# Patient Record
Sex: FEMALE | Race: WHITE | NOT HISPANIC OR LATINO | Employment: OTHER
[De-identification: names, ages, dates, MRNs, and addresses within clinical notes are randomized per-mention and may not be internally consistent; named-entity substitution may affect disease eponyms.]

---

## 2017-05-12 ENCOUNTER — CHARTING TRANS (OUTPATIENT)
Dept: OTHER | Age: 62
End: 2017-05-12

## 2017-05-12 ENCOUNTER — LAB SERVICES (OUTPATIENT)
Dept: OTHER | Age: 62
End: 2017-05-12

## 2017-05-15 LAB
ALBUMIN SERPL-MCNC: 4.3 G/DL (ref 3.6–5.1)
ALBUMIN/GLOB SERPL: 1.3 (ref 1–2.4)
ALP SERPL-CCNC: 132 UNITS/L (ref 45–117)
ALT SERPL-CCNC: 35 UNITS/L
ANION GAP SERPL CALC-SCNC: 16 MMOL/L (ref 10–20)
AST SERPL-CCNC: 19 UNITS/L
BASOPHILS # BLD: 0 K/MCL (ref 0–0.3)
BASOPHILS NFR BLD: 1 %
BILIRUB SERPL-MCNC: 0.5 MG/DL (ref 0.2–1)
BUN SERPL-MCNC: 14 MG/DL (ref 6–20)
BUN/CREAT SERPL: 24 (ref 7–25)
CALCIUM SERPL-MCNC: 9.7 MG/DL (ref 8.4–10.2)
CHLORIDE SERPL-SCNC: 107 MMOL/L (ref 98–107)
CO2 SERPL-SCNC: 26 MMOL/L (ref 21–32)
CREAT SERPL-MCNC: 0.6 MG/DL (ref 0.51–0.95)
DIFFERENTIAL METHOD BLD: ABNORMAL
EOSINOPHIL # BLD: 0.1 K/MCL (ref 0.1–0.5)
EOSINOPHIL NFR BLD: 1 %
ERYTHROCYTE [DISTWIDTH] IN BLOOD: 12.8 % (ref 11–15)
GLOBULIN SER-MCNC: 3.3 G/DL (ref 2–4)
GLUCOSE SERPL-MCNC: 98 MG/DL (ref 65–99)
HEMATOCRIT: 42.7 % (ref 36–46.5)
HEMOGLOBIN: 13.9 G/DL (ref 12–15.5)
LENGTH OF FAST TIME PATIENT: ABNORMAL HRS
LYMPHOCYTES # BLD: 1.5 K/MCL (ref 1–4)
LYMPHOCYTES NFR BLD: 21 %
MEAN CORPUSCULAR HEMOGLOBIN: 29.8 PG (ref 26–34)
MEAN CORPUSCULAR HGB CONC: 32.6 G/DL (ref 32–36.5)
MEAN CORPUSCULAR VOLUME: 91.4 FL (ref 78–100)
MONOCYTES # BLD: 0.5 K/MCL (ref 0.3–0.9)
MONOCYTES NFR BLD: 7 %
NEUTROPHILS # BLD: 5.2 K/MCL (ref 1.8–7.7)
NEUTROPHILS NFR BLD: 70 %
PLATELET COUNT: 292 K/MCL (ref 140–450)
POTASSIUM SERPL-SCNC: 4.1 MMOL/L (ref 3.4–5.1)
RED CELL COUNT: 4.67 MIL/MCL (ref 4–5.2)
SODIUM SERPL-SCNC: 145 MMOL/L (ref 135–145)
TOTAL PROTEIN: 7.6 G/DL (ref 6.4–8.2)
WHITE BLOOD COUNT: 7.3 K/MCL (ref 4.2–11)

## 2017-05-19 ENCOUNTER — LAB SERVICES (OUTPATIENT)
Dept: OTHER | Age: 62
End: 2017-05-19

## 2017-05-22 ENCOUNTER — CHARTING TRANS (OUTPATIENT)
Dept: OTHER | Age: 62
End: 2017-05-22

## 2017-05-22 LAB — SURGICAL PATHOLOGY: NORMAL

## 2017-05-25 ENCOUNTER — LAB SERVICES (OUTPATIENT)
Dept: OTHER | Age: 62
End: 2017-05-25

## 2017-05-25 ENCOUNTER — HOSPITAL (OUTPATIENT)
Dept: OTHER | Age: 62
End: 2017-05-25
Attending: OBSTETRICS & GYNECOLOGY

## 2017-05-26 ENCOUNTER — CHARTING TRANS (OUTPATIENT)
Dept: OTHER | Age: 62
End: 2017-05-26

## 2017-05-26 LAB
ABO + RH BLD: NORMAL
ANALYZER ANC (IANC): ABNORMAL
ANION GAP SERPL CALC-SCNC: 13 MMOL/L (ref 10–20)
BASOPHILS # BLD: 0 THOUSAND/MCL (ref 0–0.3)
BASOPHILS NFR BLD: 0 %
BLD GP AB SCN SERPL QL: NEGATIVE
BLOOD BANK CMNT PATIENT-IMP: NORMAL
BUN SERPL-MCNC: 9 MG/DL (ref 6–20)
BUN/CREAT SERPL: 14 (ref 7–25)
CALCIUM SERPL-MCNC: 8.6 MG/DL (ref 8.4–10.2)
CHLORIDE: 109 MMOL/L (ref 98–107)
CO2 SERPL-SCNC: 26 MMOL/L (ref 21–32)
CREAT SERPL-MCNC: 0.66 MG/DL (ref 0.51–0.95)
CROSSMATCH EXPIRE: NORMAL
DIFFERENTIAL METHOD BLD: ABNORMAL
EOSINOPHIL # BLD: 0 THOUSAND/MCL (ref 0.1–0.5)
EOSINOPHIL NFR BLD: 0 %
ERYTHROCYTE [DISTWIDTH] IN BLOOD: 13 % (ref 11–15)
GLUCOSE SERPL-MCNC: 114 MG/DL (ref 65–99)
HEMATOCRIT: 34.4 % (ref 36–46.5)
HGB BLD-MCNC: 11.4 GM/DL (ref 12–15.5)
LYMPHOCYTES # BLD: 1.8 THOUSAND/MCL (ref 1–4)
LYMPHOCYTES NFR BLD: 23 %
MCH RBC QN AUTO: 30.2 PG (ref 26–34)
MCHC RBC AUTO-ENTMCNC: 33.1 GM/DL (ref 32–36.5)
MCV RBC AUTO: 91.2 FL (ref 78–100)
MONOCYTES # BLD: 0.7 THOUSAND/MCL (ref 0.3–0.9)
MONOCYTES NFR BLD: 8 %
NEUTROPHILS # BLD: 5.6 THOUSAND/MCL (ref 1.8–7.7)
NEUTROPHILS NFR BLD: 69 %
NEUTS SEG NFR BLD: ABNORMAL %
PERCENT NRBC: ABNORMAL
PLATELET # BLD: 245 THOUSAND/MCL (ref 140–450)
POTASSIUM SERPL-SCNC: 3.9 MMOL/L (ref 3.4–5.1)
RBC # BLD: 3.77 MILLION/MCL (ref 4–5.2)
SODIUM SERPL-SCNC: 144 MMOL/L (ref 135–145)
WBC # BLD: 8.2 THOUSAND/MCL (ref 4.2–11)

## 2017-05-31 ENCOUNTER — CHARTING TRANS (OUTPATIENT)
Dept: OTHER | Age: 62
End: 2017-05-31

## 2017-06-26 ENCOUNTER — CHARTING TRANS (OUTPATIENT)
Dept: OTHER | Age: 62
End: 2017-06-26

## 2017-07-31 ENCOUNTER — IMAGING SERVICES (OUTPATIENT)
Dept: OTHER | Age: 62
End: 2017-07-31

## 2017-07-31 ENCOUNTER — HOSPITAL (OUTPATIENT)
Dept: OTHER | Age: 62
End: 2017-07-31
Attending: OBSTETRICS & GYNECOLOGY

## 2017-08-02 ENCOUNTER — HOSPITAL (OUTPATIENT)
Dept: OTHER | Age: 62
End: 2017-08-02
Attending: OBSTETRICS & GYNECOLOGY

## 2017-08-02 ENCOUNTER — IMAGING SERVICES (OUTPATIENT)
Dept: OTHER | Age: 62
End: 2017-08-02

## 2017-08-02 ENCOUNTER — CHARTING TRANS (OUTPATIENT)
Dept: OTHER | Age: 62
End: 2017-08-02

## 2017-10-06 ENCOUNTER — HOSPITAL (OUTPATIENT)
Dept: OTHER | Age: 62
End: 2017-10-06
Attending: ORTHOPAEDIC SURGERY

## 2017-10-06 LAB
AMORPH SED URNS QL MICRO: NORMAL
ANALYZER ANC (IANC): NORMAL
ANION GAP SERPL CALC-SCNC: 13 MMOL/L (ref 10–20)
APPEARANCE UR: CLEAR
BILIRUB UR QL: NEGATIVE
BUN SERPL-MCNC: 19 MG/DL (ref 6–20)
BUN/CREAT SERPL: 29 (ref 7–25)
CALCIUM SERPL-MCNC: 8.8 MG/DL (ref 8.4–10.2)
CAOX CRY URNS QL MICRO: NORMAL
CHLORIDE: 106 MMOL/L (ref 98–107)
CO2 SERPL-SCNC: 27 MMOL/L (ref 21–32)
COLOR UR: YELLOW
CREAT SERPL-MCNC: 0.66 MG/DL (ref 0.51–0.95)
EPITH CASTS #/AREA URNS LPF: NORMAL /[LPF]
ERYTHROCYTE [DISTWIDTH] IN BLOOD: 12.8 % (ref 11–15)
FATTY CASTS #/AREA URNS LPF: NORMAL /[LPF]
GLUCOSE SERPL-MCNC: 111 MG/DL (ref 65–99)
GLUCOSE UR-MCNC: NEGATIVE MG/DL
GRAN CASTS #/AREA URNS LPF: NORMAL /[LPF]
HEMATOCRIT: 39.8 % (ref 36–46.5)
HGB BLD-MCNC: 13.7 GM/DL (ref 12–15.5)
HGB UR QL: NEGATIVE
HYALINE CASTS #/AREA URNS LPF: NORMAL /[LPF]
INR PPP: 1
KETONES UR-MCNC: NEGATIVE MG/DL
LEUKOCYTE ESTERASE UR QL STRIP: NEGATIVE
MCH RBC QN AUTO: 30.9 PG (ref 26–34)
MCHC RBC AUTO-ENTMCNC: 34.4 GM/DL (ref 32–36.5)
MCV RBC AUTO: 89.8 FL (ref 78–100)
MICROSCOPIC (MT): NORMAL
MIXED CELL CASTS #/AREA URNS LPF: NORMAL /[LPF]
MUCOUS THREADS URNS QL MICRO: NORMAL
NITRITE UR QL: NEGATIVE
PH UR: 7 UNIT (ref 5–7)
PLATELET # BLD: 277 THOUSAND/MCL (ref 140–450)
POTASSIUM SERPL-SCNC: 4 MMOL/L (ref 3.4–5.1)
PROT UR QL: NEGATIVE MG/DL
PROTHROMBIN TIME: 10.5 SECONDS (ref 9.7–11.8)
PROTHROMBIN TIME: NORMAL
RBC # BLD: 4.43 MILLION/MCL (ref 4–5.2)
RBC CASTS #/AREA URNS LPF: NORMAL /[LPF]
RENAL EPI CELLS #/AREA URNS HPF: NORMAL /[HPF]
SODIUM SERPL-SCNC: 142 MMOL/L (ref 135–145)
SP GR UR: 1.02 (ref 1–1.03)
SPECIMEN SOURCE: NORMAL
SPERM URNS QL MICRO: NORMAL
T VAGINALIS URNS QL MICRO: NORMAL
TRI-PHOS CRY URNS QL MICRO: NORMAL
URATE CRY URNS QL MICRO: NORMAL
URNS CMNT MICRO: NORMAL
UROBILINOGEN UR QL: 0.2 MG/DL (ref 0–1)
WAXY CASTS #/AREA URNS LPF: NORMAL /[LPF]
WBC # BLD: 6.4 THOUSAND/MCL (ref 4.2–11)
WBC CASTS #/AREA URNS LPF: NORMAL /[LPF]
YEAST HYPHAE URNS QL MICRO: NORMAL
YEAST URNS QL MICRO: NORMAL

## 2017-10-07 ENCOUNTER — DIAGNOSTIC TRANS (OUTPATIENT)
Dept: OTHER | Age: 62
End: 2017-10-07

## 2017-10-13 ENCOUNTER — CHARTING TRANS (OUTPATIENT)
Dept: OTHER | Age: 62
End: 2017-10-13

## 2017-10-13 ENCOUNTER — HOSPITAL (OUTPATIENT)
Dept: OTHER | Age: 62
End: 2017-10-13
Attending: ORTHOPAEDIC SURGERY

## 2017-10-20 ENCOUNTER — HOSPITAL (OUTPATIENT)
Dept: OTHER | Age: 62
End: 2017-10-20
Attending: ORTHOPAEDIC SURGERY

## 2017-11-01 ENCOUNTER — HOSPITAL (OUTPATIENT)
Dept: OTHER | Age: 62
End: 2017-11-01
Attending: ORTHOPAEDIC SURGERY

## 2017-11-20 ENCOUNTER — CHARTING TRANS (OUTPATIENT)
Dept: OTHER | Age: 62
End: 2017-11-20

## 2018-05-22 ENCOUNTER — MYAURORA ACCOUNT LINK (OUTPATIENT)
Dept: OTHER | Age: 63
End: 2018-05-22

## 2018-05-22 ENCOUNTER — CHARTING TRANS (OUTPATIENT)
Dept: OTHER | Age: 63
End: 2018-05-22

## 2018-06-16 ENCOUNTER — LAB ENCOUNTER (OUTPATIENT)
Dept: LAB | Age: 63
End: 2018-06-16
Attending: INTERNAL MEDICINE
Payer: COMMERCIAL

## 2018-06-16 DIAGNOSIS — K90.0 CELIAC DISEASE: Primary | ICD-10-CM

## 2018-06-16 DIAGNOSIS — R14.0 ABDOMINAL DISTENTION: ICD-10-CM

## 2018-06-16 DIAGNOSIS — R19.4 CHANGE IN BOWEL HABITS: ICD-10-CM

## 2018-06-16 PROCEDURE — 82728 ASSAY OF FERRITIN: CPT

## 2018-06-16 PROCEDURE — 84443 ASSAY THYROID STIM HORMONE: CPT

## 2018-06-16 PROCEDURE — 82306 VITAMIN D 25 HYDROXY: CPT

## 2018-06-16 PROCEDURE — 85025 COMPLETE CBC W/AUTO DIFF WBC: CPT

## 2018-06-16 PROCEDURE — 36415 COLL VENOUS BLD VENIPUNCTURE: CPT

## 2018-06-16 PROCEDURE — 83516 IMMUNOASSAY NONANTIBODY: CPT

## 2018-06-16 PROCEDURE — 80053 COMPREHEN METABOLIC PANEL: CPT

## 2018-06-16 PROCEDURE — 82784 ASSAY IGA/IGD/IGG/IGM EACH: CPT

## 2018-08-10 ENCOUNTER — IMAGING SERVICES (OUTPATIENT)
Dept: OTHER | Age: 63
End: 2018-08-10

## 2018-08-10 ENCOUNTER — HOSPITAL (OUTPATIENT)
Dept: OTHER | Age: 63
End: 2018-08-10
Attending: OBSTETRICS & GYNECOLOGY

## 2018-11-01 VITALS
BODY MASS INDEX: 25.83 KG/M2 | DIASTOLIC BLOOD PRESSURE: 82 MMHG | SYSTOLIC BLOOD PRESSURE: 124 MMHG | WEIGHT: 155 LBS | HEIGHT: 65 IN

## 2018-11-02 VITALS — HEIGHT: 65 IN | WEIGHT: 155 LBS | BODY MASS INDEX: 25.83 KG/M2

## 2018-11-03 VITALS
WEIGHT: 153 LBS | SYSTOLIC BLOOD PRESSURE: 118 MMHG | DIASTOLIC BLOOD PRESSURE: 74 MMHG | HEIGHT: 65 IN | BODY MASS INDEX: 25.49 KG/M2

## 2018-11-03 VITALS — BODY MASS INDEX: 25.99 KG/M2 | HEIGHT: 65 IN | WEIGHT: 156 LBS

## 2018-11-03 VITALS
WEIGHT: 157 LBS | HEIGHT: 65 IN | BODY MASS INDEX: 26.16 KG/M2 | DIASTOLIC BLOOD PRESSURE: 74 MMHG | SYSTOLIC BLOOD PRESSURE: 116 MMHG

## 2018-12-27 PROBLEM — L98.9 SKIN LESION OF FACE: Status: ACTIVE | Noted: 2018-12-27

## 2018-12-27 PROBLEM — K64.9 HEMORRHOIDS: Status: ACTIVE | Noted: 2018-12-27

## 2018-12-27 PROBLEM — C54.1 ENDOMETRIAL CANCER (CMD): Status: ACTIVE | Noted: 2018-12-27

## 2018-12-27 PROBLEM — Z87.19 HISTORY OF CELIAC DISEASE: Status: ACTIVE | Noted: 2018-12-27

## 2018-12-27 PROBLEM — K62.5 RECTAL BLEEDING: Status: ACTIVE | Noted: 2018-12-27

## 2019-01-02 ENCOUNTER — OFFICE VISIT (OUTPATIENT)
Dept: GYNECOLOGIC ONCOLOGY | Age: 64
End: 2019-01-02

## 2019-01-02 VITALS
BODY MASS INDEX: 25.83 KG/M2 | SYSTOLIC BLOOD PRESSURE: 122 MMHG | HEIGHT: 65 IN | WEIGHT: 155 LBS | DIASTOLIC BLOOD PRESSURE: 82 MMHG

## 2019-01-02 DIAGNOSIS — C54.1 ENDOMETRIAL CANCER (CMD): Primary | ICD-10-CM

## 2019-01-02 PROCEDURE — 99213 OFFICE O/P EST LOW 20 MIN: CPT | Performed by: OBSTETRICS & GYNECOLOGY

## 2019-01-02 RX ORDER — LANOLIN ALCOHOL/MO/W.PET/CERES
CREAM (GRAM) TOPICAL
COMMUNITY
Start: 2016-05-05

## 2019-01-02 RX ORDER — FEXOFENADINE HCL 60 MG/1
TABLET, FILM COATED ORAL
COMMUNITY

## 2019-01-02 RX ORDER — CEFUROXIME AXETIL 250 MG/1
TABLET ORAL
COMMUNITY
Start: 2016-03-01

## 2019-01-02 RX ORDER — DIPHENHYDRAMINE HCL 25 MG
CAPSULE ORAL
COMMUNITY

## 2019-01-02 ASSESSMENT — ENCOUNTER SYMPTOMS
ALLERGIC/IMMUNOLOGIC NEGATIVE: 1
ABDOMINAL PAIN: 0
DIARRHEA: 0
ENDOCRINE NEGATIVE: 1
NAUSEA: 0
NEUROLOGICAL NEGATIVE: 1
RESPIRATORY NEGATIVE: 1
PSYCHIATRIC NEGATIVE: 1
EYES NEGATIVE: 1
BLOOD IN STOOL: 0
HEMATOLOGIC/LYMPHATIC NEGATIVE: 1
CONSTITUTIONAL NEGATIVE: 1
ANAL BLEEDING: 1
VOMITING: 0
RECTAL PAIN: 0
CONSTIPATION: 1

## 2019-05-27 ENCOUNTER — APPOINTMENT (OUTPATIENT)
Dept: GYNECOLOGIC ONCOLOGY | Age: 64
End: 2019-05-27

## 2019-06-03 ENCOUNTER — APPOINTMENT (OUTPATIENT)
Dept: GYNECOLOGIC ONCOLOGY | Age: 64
End: 2019-06-03

## 2019-06-10 ENCOUNTER — OFFICE VISIT (OUTPATIENT)
Dept: GYNECOLOGIC ONCOLOGY | Age: 64
End: 2019-06-10

## 2019-06-10 VITALS
BODY MASS INDEX: 25.83 KG/M2 | DIASTOLIC BLOOD PRESSURE: 70 MMHG | WEIGHT: 155 LBS | SYSTOLIC BLOOD PRESSURE: 122 MMHG | HEIGHT: 65 IN

## 2019-06-10 DIAGNOSIS — Z12.39 BREAST CANCER SCREENING: Primary | ICD-10-CM

## 2019-06-10 DIAGNOSIS — Z87.19 HISTORY OF CELIAC DISEASE: ICD-10-CM

## 2019-06-10 DIAGNOSIS — C54.1 ENDOMETRIAL CANCER (CMD): ICD-10-CM

## 2019-06-10 PROCEDURE — 99212 OFFICE O/P EST SF 10 MIN: CPT | Performed by: OBSTETRICS & GYNECOLOGY

## 2019-06-10 ASSESSMENT — ENCOUNTER SYMPTOMS
NEUROLOGICAL NEGATIVE: 1
PSYCHIATRIC NEGATIVE: 1
GASTROINTESTINAL NEGATIVE: 1
EYES NEGATIVE: 1
RESPIRATORY NEGATIVE: 1
CONSTITUTIONAL NEGATIVE: 1
HEMATOLOGIC/LYMPHATIC NEGATIVE: 1
ENDOCRINE NEGATIVE: 1
ALLERGIC/IMMUNOLOGIC NEGATIVE: 1

## 2019-06-11 LAB
APPEARANCE UR: CLEAR
BILIRUB UR QL STRIP: NEGATIVE
COLOR UR: ABNORMAL
GLUCOSE UR STRIP-MCNC: NEGATIVE MG/DL
HGB UR QL STRIP: NEGATIVE
KETONES UR STRIP-MCNC: NEGATIVE MG/DL
LEUKOCYTE ESTERASE UR QL STRIP: NEGATIVE
NITRITE UR QL STRIP: NEGATIVE
PH UR STRIP: 6 UNITS (ref 5–7)
PROT UR STRIP-MCNC: NEGATIVE MG/DL
SP GR UR STRIP: 1.01 (ref 1–1.03)
SPECIMEN SOURCE: ABNORMAL
UROBILINOGEN UR STRIP-MCNC: 0.2 MG/DL (ref 0–1)

## 2019-08-16 ENCOUNTER — HOSPITAL (OUTPATIENT)
Dept: OTHER | Age: 64
End: 2019-08-16
Attending: OBSTETRICS & GYNECOLOGY

## 2019-08-21 PROBLEM — R92.2 DENSE BREASTS: Status: ACTIVE | Noted: 2019-08-21

## 2019-08-21 PROBLEM — R92.30 DENSE BREASTS: Status: ACTIVE | Noted: 2019-08-21

## 2020-01-03 ENCOUNTER — TELEPHONE (OUTPATIENT)
Dept: GYNECOLOGIC ONCOLOGY | Age: 65
End: 2020-01-03

## 2020-01-03 DIAGNOSIS — C54.1 ENDOMETRIAL CANCER (CMD): Primary | ICD-10-CM

## 2020-01-03 DIAGNOSIS — Z80.9 FAMILY HISTORY OF CANCER: ICD-10-CM

## 2020-01-06 ENCOUNTER — OFFICE VISIT (OUTPATIENT)
Dept: GENETICS | Age: 65
End: 2020-01-06

## 2020-01-06 DIAGNOSIS — C54.1 ENDOMETRIAL CANCER (CMD): Primary | ICD-10-CM

## 2020-01-06 PROCEDURE — 99204 OFFICE O/P NEW MOD 45 MIN: CPT

## 2020-01-06 PROCEDURE — 36415 COLL VENOUS BLD VENIPUNCTURE: CPT

## 2020-01-15 ENCOUNTER — DOCUMENTATION (OUTPATIENT)
Dept: GENETICS | Age: 65
End: 2020-01-15

## 2020-06-15 ENCOUNTER — OFFICE VISIT (OUTPATIENT)
Dept: GYNECOLOGIC ONCOLOGY | Age: 65
End: 2020-06-15

## 2020-06-15 ENCOUNTER — TELEPHONE (OUTPATIENT)
Dept: GYNECOLOGIC ONCOLOGY | Age: 65
End: 2020-06-15

## 2020-06-15 VITALS
HEIGHT: 66 IN | WEIGHT: 150.57 LBS | DIASTOLIC BLOOD PRESSURE: 80 MMHG | SYSTOLIC BLOOD PRESSURE: 126 MMHG | BODY MASS INDEX: 24.2 KG/M2

## 2020-06-15 DIAGNOSIS — C54.1 ENDOMETRIAL CANCER (CMD): ICD-10-CM

## 2020-06-15 DIAGNOSIS — Z87.19 HISTORY OF CELIAC DISEASE: ICD-10-CM

## 2020-06-15 DIAGNOSIS — R92.2 DENSE BREASTS: ICD-10-CM

## 2020-06-15 DIAGNOSIS — R92.30 DENSE BREASTS: ICD-10-CM

## 2020-06-15 DIAGNOSIS — Z13.820 ENCOUNTER FOR OSTEOPOROSIS SCREENING IN ASYMPTOMATIC POSTMENOPAUSAL PATIENT: Primary | ICD-10-CM

## 2020-06-15 DIAGNOSIS — Z78.0 ENCOUNTER FOR OSTEOPOROSIS SCREENING IN ASYMPTOMATIC POSTMENOPAUSAL PATIENT: Primary | ICD-10-CM

## 2020-06-15 PROCEDURE — 99212 OFFICE O/P EST SF 10 MIN: CPT | Performed by: OBSTETRICS & GYNECOLOGY

## 2020-06-15 RX ORDER — CALCIUM POLYCARBOPHIL 625 MG 625 MG/1
625 TABLET ORAL DAILY
COMMUNITY

## 2020-06-15 ASSESSMENT — ENCOUNTER SYMPTOMS
EYES NEGATIVE: 1
NEUROLOGICAL NEGATIVE: 1
GASTROINTESTINAL NEGATIVE: 1
RESPIRATORY NEGATIVE: 1
PSYCHIATRIC NEGATIVE: 1
CONSTITUTIONAL NEGATIVE: 1
ENDOCRINE NEGATIVE: 1
ALLERGIC/IMMUNOLOGIC NEGATIVE: 1
HEMATOLOGIC/LYMPHATIC NEGATIVE: 1

## 2020-09-28 ENCOUNTER — HOSPITAL ENCOUNTER (OUTPATIENT)
Dept: GENERAL RADIOLOGY | Age: 65
Discharge: HOME | End: 2020-09-28
Attending: OBSTETRICS & GYNECOLOGY

## 2020-09-28 ENCOUNTER — HOSPITAL ENCOUNTER (OUTPATIENT)
Dept: MAMMOGRAPHY | Age: 65
Discharge: HOME | End: 2020-09-28
Attending: OBSTETRICS & GYNECOLOGY

## 2020-09-28 DIAGNOSIS — C54.1 ENDOMETRIAL CANCER (CMD): ICD-10-CM

## 2020-09-28 DIAGNOSIS — Z78.0 ENCOUNTER FOR OSTEOPOROSIS SCREENING IN ASYMPTOMATIC POSTMENOPAUSAL PATIENT: ICD-10-CM

## 2020-09-28 DIAGNOSIS — Z13.820 ENCOUNTER FOR OSTEOPOROSIS SCREENING IN ASYMPTOMATIC POSTMENOPAUSAL PATIENT: ICD-10-CM

## 2020-09-28 DIAGNOSIS — Z78.0 POST-MENOPAUSAL: ICD-10-CM

## 2020-09-28 PROCEDURE — 77063 BREAST TOMOSYNTHESIS BI: CPT

## 2020-09-28 PROCEDURE — 77080 DXA BONE DENSITY AXIAL: CPT

## 2020-10-01 PROBLEM — M85.89 OSTEOPENIA OF MULTIPLE SITES: Status: ACTIVE | Noted: 2020-10-01

## 2021-03-15 DIAGNOSIS — Z23 NEED FOR VACCINATION: ICD-10-CM

## 2021-03-25 ENCOUNTER — TELEPHONE (OUTPATIENT)
Dept: INTERNAL MEDICINE | Age: 66
End: 2021-03-25

## 2021-03-25 ENCOUNTER — TELEPHONE (OUTPATIENT)
Dept: SCHEDULING | Age: 66
End: 2021-03-25

## 2021-04-24 ENCOUNTER — TELEPHONE (OUTPATIENT)
Dept: INTERNAL MEDICINE | Age: 66
End: 2021-04-24

## 2021-08-09 ENCOUNTER — OFFICE VISIT (OUTPATIENT)
Dept: GYNECOLOGIC ONCOLOGY | Age: 66
End: 2021-08-09

## 2021-08-09 VITALS — WEIGHT: 151 LBS | BODY MASS INDEX: 24.75 KG/M2 | SYSTOLIC BLOOD PRESSURE: 148 MMHG | DIASTOLIC BLOOD PRESSURE: 84 MMHG

## 2021-08-09 DIAGNOSIS — C54.1 ENDOMETRIAL CANCER (CMD): ICD-10-CM

## 2021-08-09 DIAGNOSIS — Z87.19 HISTORY OF CELIAC DISEASE: ICD-10-CM

## 2021-08-09 DIAGNOSIS — Z12.31 ENCOUNTER FOR SCREENING MAMMOGRAM FOR MALIGNANT NEOPLASM OF BREAST: Primary | ICD-10-CM

## 2021-08-09 PROCEDURE — 99214 OFFICE O/P EST MOD 30 MIN: CPT | Performed by: OBSTETRICS & GYNECOLOGY

## 2021-08-09 ASSESSMENT — ENCOUNTER SYMPTOMS
ALLERGIC/IMMUNOLOGIC NEGATIVE: 1
GASTROINTESTINAL NEGATIVE: 1
CONSTITUTIONAL NEGATIVE: 1
ENDOCRINE NEGATIVE: 1
HEMATOLOGIC/LYMPHATIC NEGATIVE: 1
PSYCHIATRIC NEGATIVE: 1
EYES NEGATIVE: 1
RESPIRATORY NEGATIVE: 1
NEUROLOGICAL NEGATIVE: 1

## 2021-10-08 ENCOUNTER — HOSPITAL ENCOUNTER (OUTPATIENT)
Dept: MAMMOGRAPHY | Age: 66
Discharge: HOME | End: 2021-10-08
Attending: OBSTETRICS & GYNECOLOGY

## 2021-10-08 DIAGNOSIS — Z12.31 ENCOUNTER FOR SCREENING MAMMOGRAM FOR MALIGNANT NEOPLASM OF BREAST: ICD-10-CM

## 2021-10-08 PROCEDURE — 77063 BREAST TOMOSYNTHESIS BI: CPT

## 2022-01-10 ENCOUNTER — OFFICE VISIT (OUTPATIENT)
Dept: GYNECOLOGIC ONCOLOGY | Age: 67
End: 2022-01-10

## 2022-01-10 DIAGNOSIS — C54.1 ENDOMETRIAL CANCER (CMD): Primary | ICD-10-CM

## 2022-01-10 PROCEDURE — 99213 OFFICE O/P EST LOW 20 MIN: CPT | Performed by: OBSTETRICS & GYNECOLOGY

## 2022-01-10 ASSESSMENT — ENCOUNTER SYMPTOMS
RESPIRATORY NEGATIVE: 1
HEMATOLOGIC/LYMPHATIC NEGATIVE: 1
ALLERGIC/IMMUNOLOGIC NEGATIVE: 1
CONSTITUTIONAL NEGATIVE: 1
NEUROLOGICAL NEGATIVE: 1
PSYCHIATRIC NEGATIVE: 1
ENDOCRINE NEGATIVE: 1
EYES NEGATIVE: 1
GASTROINTESTINAL NEGATIVE: 1

## 2022-06-06 ENCOUNTER — HOSPITAL ENCOUNTER (OUTPATIENT)
Age: 67
Discharge: HOME | End: 2022-06-06
Payer: MEDICARE

## 2022-06-06 VITALS
HEIGHT: 65 IN | HEART RATE: 86 BPM | WEIGHT: 151 LBS | DIASTOLIC BLOOD PRESSURE: 88 MMHG | RESPIRATION RATE: 16 BRPM | BODY MASS INDEX: 25.16 KG/M2 | OXYGEN SATURATION: 97 % | TEMPERATURE: 98 F | SYSTOLIC BLOOD PRESSURE: 142 MMHG

## 2022-06-06 DIAGNOSIS — L03.116 CELLULITIS OF LEFT ANKLE: Primary | ICD-10-CM

## 2022-06-06 PROCEDURE — 99203 OFFICE O/P NEW LOW 30 MIN: CPT | Performed by: NURSE PRACTITIONER

## 2022-06-06 RX ORDER — SULFAMETHOXAZOLE AND TRIMETHOPRIM 800; 160 MG/1; MG/1
1 TABLET ORAL 2 TIMES DAILY
Qty: 14 TABLET | Refills: 0 | Status: SHIPPED | OUTPATIENT
Start: 2022-06-06 | End: 2022-06-13

## 2022-06-06 NOTE — ED INITIAL ASSESSMENT (HPI)
Patient presents a&o4/4 with complaints of L ankle pain. States received injection to L ankle ~ 3 weeks ago. Ankle is swollen, reddened, warm.

## 2022-06-15 ENCOUNTER — HOSPITAL ENCOUNTER (OUTPATIENT)
Age: 67
Discharge: ANOTHER HOSPITAL | End: 2022-06-15
Payer: MEDICARE

## 2022-06-15 VITALS
RESPIRATION RATE: 18 BRPM | BODY MASS INDEX: 24.69 KG/M2 | OXYGEN SATURATION: 99 % | WEIGHT: 150 LBS | TEMPERATURE: 99 F | DIASTOLIC BLOOD PRESSURE: 83 MMHG | SYSTOLIC BLOOD PRESSURE: 149 MMHG | HEART RATE: 91 BPM | HEIGHT: 65.5 IN

## 2022-06-15 DIAGNOSIS — L03.116 CELLULITIS OF LEFT LOWER EXTREMITY: Primary | ICD-10-CM

## 2022-06-15 PROCEDURE — 99214 OFFICE O/P EST MOD 30 MIN: CPT | Performed by: NURSE PRACTITIONER

## 2022-06-15 NOTE — ED INITIAL ASSESSMENT (HPI)
Pt presents to the IC with c/o a rash to the body after being on antibiotics for cellulitis to the left foot. Redness has extended 3 inches outside the marked area on Saturday. Denies fevers.

## 2022-09-12 ENCOUNTER — OFFICE VISIT (OUTPATIENT)
Dept: GYNECOLOGIC ONCOLOGY | Age: 67
End: 2022-09-12

## 2022-09-12 VITALS
HEIGHT: 66 IN | HEART RATE: 85 BPM | WEIGHT: 148.8 LBS | BODY MASS INDEX: 23.91 KG/M2 | RESPIRATION RATE: 18 BRPM | OXYGEN SATURATION: 99 % | SYSTOLIC BLOOD PRESSURE: 138 MMHG | DIASTOLIC BLOOD PRESSURE: 83 MMHG

## 2022-09-12 DIAGNOSIS — C54.1 ENDOMETRIAL CANCER (CMD): ICD-10-CM

## 2022-09-12 DIAGNOSIS — Z87.19 HISTORY OF CELIAC DISEASE: ICD-10-CM

## 2022-09-12 DIAGNOSIS — Z12.31 BREAST CANCER SCREENING BY MAMMOGRAM: Primary | ICD-10-CM

## 2022-09-12 PROCEDURE — 99214 OFFICE O/P EST MOD 30 MIN: CPT | Performed by: OBSTETRICS & GYNECOLOGY

## 2022-09-12 ASSESSMENT — ENCOUNTER SYMPTOMS
RESPIRATORY NEGATIVE: 1
ALLERGIC/IMMUNOLOGIC NEGATIVE: 1
ENDOCRINE NEGATIVE: 1
HEMATOLOGIC/LYMPHATIC NEGATIVE: 1
EYES NEGATIVE: 1
CONSTITUTIONAL NEGATIVE: 1
PSYCHIATRIC NEGATIVE: 1
NEUROLOGICAL NEGATIVE: 1
GASTROINTESTINAL NEGATIVE: 1

## 2022-10-15 ENCOUNTER — HOSPITAL ENCOUNTER (OUTPATIENT)
Dept: MAMMOGRAPHY | Age: 67
Discharge: HOME | End: 2022-10-15
Attending: OBSTETRICS & GYNECOLOGY

## 2022-10-15 ENCOUNTER — APPOINTMENT (OUTPATIENT)
Dept: MAMMOGRAPHY | Age: 67
End: 2022-10-15
Attending: OBSTETRICS & GYNECOLOGY

## 2022-10-15 DIAGNOSIS — Z12.31 BREAST CANCER SCREENING BY MAMMOGRAM: ICD-10-CM

## 2022-10-15 PROCEDURE — 77063 BREAST TOMOSYNTHESIS BI: CPT

## 2023-08-08 ENCOUNTER — HOSPITAL ENCOUNTER (OUTPATIENT)
Age: 68
Discharge: HOME | End: 2023-08-08
Payer: MEDICARE

## 2023-08-08 VITALS
RESPIRATION RATE: 18 BRPM | BODY MASS INDEX: 24.27 KG/M2 | WEIGHT: 151 LBS | HEIGHT: 66 IN | SYSTOLIC BLOOD PRESSURE: 124 MMHG | OXYGEN SATURATION: 97 % | DIASTOLIC BLOOD PRESSURE: 88 MMHG | HEART RATE: 93 BPM | TEMPERATURE: 99 F

## 2023-08-08 DIAGNOSIS — L03.116 CELLULITIS OF LEFT LOWER EXTREMITY: Primary | ICD-10-CM

## 2023-08-08 PROCEDURE — 99213 OFFICE O/P EST LOW 20 MIN: CPT | Performed by: NURSE PRACTITIONER

## 2023-08-08 RX ORDER — CEPHALEXIN 500 MG/1
500 CAPSULE ORAL ONCE
Status: COMPLETED | OUTPATIENT
Start: 2023-08-08 | End: 2023-08-08

## 2023-08-08 RX ORDER — CEPHALEXIN 500 MG/1
500 CAPSULE ORAL 2 TIMES DAILY
Qty: 20 CAPSULE | Refills: 0 | Status: SHIPPED | OUTPATIENT
Start: 2023-08-08 | End: 2023-08-18

## 2023-08-08 NOTE — DISCHARGE INSTRUCTIONS
Discussed, please monitor area for signs and symptoms of infection. First signs and symptoms of infection are: Pain and fevers above 100.4. Additionally, if there is any worsening redness, swelling that extends beyond skin parameter borders, please follow-up in emergency room. If there is any worsening or new drainage or discharge, go to ER. Please try to elevate extremity and apply ice 4 times a day for at least 15 minutes. Avoid excessive weightbearing, ambulation, exercise to avoid further inflammation and swelling. I have prescribed antibiotics twice a day for 10 days. If you have any worsening signs symptoms while on antibiotics, please go to ER for further evaluation and likely inpatient admission and IV antibiotics. Keep your follow-up appointment with a specialist for Monday.

## 2023-09-08 ENCOUNTER — TELEPHONE (OUTPATIENT)
Dept: GYNECOLOGIC ONCOLOGY | Age: 68
End: 2023-09-08

## 2023-10-02 ENCOUNTER — OFFICE VISIT (OUTPATIENT)
Dept: GYNECOLOGIC ONCOLOGY | Age: 68
End: 2023-10-02

## 2023-10-02 VITALS
BODY MASS INDEX: 24.58 KG/M2 | WEIGHT: 150 LBS | OXYGEN SATURATION: 99 % | DIASTOLIC BLOOD PRESSURE: 92 MMHG | HEART RATE: 81 BPM | SYSTOLIC BLOOD PRESSURE: 171 MMHG

## 2023-10-02 DIAGNOSIS — Z85.42 HISTORY OF ENDOMETRIAL CANCER: ICD-10-CM

## 2023-10-02 DIAGNOSIS — Z12.31 BREAST CANCER SCREENING BY MAMMOGRAM: Primary | ICD-10-CM

## 2023-10-02 DIAGNOSIS — Z87.19 HISTORY OF CELIAC DISEASE: ICD-10-CM

## 2023-10-02 DIAGNOSIS — Z00.00 NORMAL FEMALE BREAST EXAM: ICD-10-CM

## 2023-10-02 PROBLEM — C54.1 ENDOMETRIAL CANCER  (CMD): Status: RESOLVED | Noted: 2018-12-27 | Resolved: 2023-10-02

## 2023-10-02 PROCEDURE — 99214 OFFICE O/P EST MOD 30 MIN: CPT | Performed by: OBSTETRICS & GYNECOLOGY

## 2023-10-02 ASSESSMENT — ENCOUNTER SYMPTOMS
EYES NEGATIVE: 1
HEMATOLOGIC/LYMPHATIC NEGATIVE: 1
RESPIRATORY NEGATIVE: 1
CONSTITUTIONAL NEGATIVE: 1
PSYCHIATRIC NEGATIVE: 1
ALLERGIC/IMMUNOLOGIC NEGATIVE: 1
GASTROINTESTINAL NEGATIVE: 1
NEUROLOGICAL NEGATIVE: 1
ENDOCRINE NEGATIVE: 1

## 2023-10-17 ENCOUNTER — HOSPITAL ENCOUNTER (OUTPATIENT)
Dept: MAMMOGRAPHY | Age: 68
Discharge: HOME | End: 2023-10-17
Attending: OBSTETRICS & GYNECOLOGY

## 2023-10-17 DIAGNOSIS — Z12.31 BREAST CANCER SCREENING BY MAMMOGRAM: ICD-10-CM

## 2023-10-17 PROCEDURE — 77067 SCR MAMMO BI INCL CAD: CPT

## 2023-11-16 ENCOUNTER — HOSPITAL ENCOUNTER (OUTPATIENT)
Age: 68
Discharge: HOME | End: 2023-11-16
Payer: MEDICARE

## 2023-11-16 ENCOUNTER — APPOINTMENT (OUTPATIENT)
Dept: GENERAL RADIOLOGY | Age: 68
End: 2023-11-16
Attending: NURSE PRACTITIONER
Payer: MEDICARE

## 2023-11-16 VITALS
DIASTOLIC BLOOD PRESSURE: 89 MMHG | SYSTOLIC BLOOD PRESSURE: 149 MMHG | TEMPERATURE: 98 F | RESPIRATION RATE: 20 BRPM | HEART RATE: 105 BPM | OXYGEN SATURATION: 97 %

## 2023-11-16 DIAGNOSIS — R05.1 ACUTE COUGH: ICD-10-CM

## 2023-11-16 DIAGNOSIS — J01.90 ACUTE RHINOSINUSITIS: Primary | ICD-10-CM

## 2023-11-16 PROCEDURE — 71046 X-RAY EXAM CHEST 2 VIEWS: CPT | Performed by: NURSE PRACTITIONER

## 2023-11-16 RX ORDER — BENZONATATE 200 MG/1
200 CAPSULE ORAL 3 TIMES DAILY PRN
Qty: 30 CAPSULE | Refills: 0 | Status: SHIPPED | OUTPATIENT
Start: 2023-11-16

## 2023-11-16 RX ORDER — AMOXICILLIN AND CLAVULANATE POTASSIUM 875; 125 MG/1; MG/1
1 TABLET, FILM COATED ORAL 2 TIMES DAILY
Qty: 14 TABLET | Refills: 0 | Status: SHIPPED | OUTPATIENT
Start: 2023-11-16 | End: 2023-11-23

## 2023-11-17 NOTE — DISCHARGE INSTRUCTIONS
Augmentin 1 tablet twice per day for 7 days with food  Tessalon Perles 1 tablet every 8 hours as needed for cough  Flonase nasal spray, 2 sprays in each nostril daily for 2 weeks  Radha Rust Sinus rinse, available over the counter, do this 2-3 times per day  Push fluids  Steam showers  If you develop facial swelling, chest pain, shortness of breath or any new/worsening symptoms, return or go to the emergency room  If no improvement in 1 week, please see your primary care provider  Please follow up with your primary care provider in 1 week to discussed CT chest

## 2024-08-28 DIAGNOSIS — Z12.31 VISIT FOR SCREENING MAMMOGRAM: Primary | ICD-10-CM

## 2024-10-07 ENCOUNTER — OFFICE VISIT (OUTPATIENT)
Dept: GYNECOLOGIC ONCOLOGY | Age: 69
End: 2024-10-07

## 2024-10-07 VITALS
WEIGHT: 153 LBS | BODY MASS INDEX: 25.07 KG/M2 | SYSTOLIC BLOOD PRESSURE: 152 MMHG | HEART RATE: 96 BPM | OXYGEN SATURATION: 97 % | DIASTOLIC BLOOD PRESSURE: 80 MMHG

## 2024-10-07 DIAGNOSIS — R92.30 DENSE BREASTS: ICD-10-CM

## 2024-10-07 DIAGNOSIS — Z87.19 HISTORY OF CELIAC DISEASE: ICD-10-CM

## 2024-10-07 DIAGNOSIS — Z85.42 HISTORY OF ENDOMETRIAL CANCER: Primary | ICD-10-CM

## 2024-10-07 DIAGNOSIS — M85.89 OSTEOPENIA OF MULTIPLE SITES: ICD-10-CM

## 2024-10-07 ASSESSMENT — ENCOUNTER SYMPTOMS
ALLERGIC/IMMUNOLOGIC NEGATIVE: 1
CONSTITUTIONAL NEGATIVE: 1
ENDOCRINE NEGATIVE: 1
PSYCHIATRIC NEGATIVE: 1
GASTROINTESTINAL NEGATIVE: 1
NEUROLOGICAL NEGATIVE: 1
HEMATOLOGIC/LYMPHATIC NEGATIVE: 1
RESPIRATORY NEGATIVE: 1
EYES NEGATIVE: 1

## 2024-10-18 ENCOUNTER — HOSPITAL ENCOUNTER (OUTPATIENT)
Dept: MAMMOGRAPHY | Age: 69
End: 2024-10-18
Attending: INTERNAL MEDICINE

## 2024-10-18 DIAGNOSIS — Z12.31 VISIT FOR SCREENING MAMMOGRAM: ICD-10-CM

## 2024-10-18 PROCEDURE — 77067 SCR MAMMO BI INCL CAD: CPT

## 2025-02-27 DIAGNOSIS — Z78.0 MENOPAUSE: Primary | ICD-10-CM

## 2025-03-11 ENCOUNTER — APPOINTMENT (OUTPATIENT)
Dept: GENERAL RADIOLOGY | Age: 70
End: 2025-03-11
Attending: INTERNAL MEDICINE

## 2025-03-20 ENCOUNTER — HOSPITAL ENCOUNTER (OUTPATIENT)
Dept: GENERAL RADIOLOGY | Age: 70
Discharge: HOME | End: 2025-03-20
Attending: INTERNAL MEDICINE

## 2025-03-20 DIAGNOSIS — Z78.0 MENOPAUSE: ICD-10-CM

## 2025-03-20 LAB
DEXA FRACTURE RISK HIP: NORMAL
DEXA FRACTURE RISK MAJOR: NORMAL
DEXA LT FEMNECK TSCORE: -1.4
DEXA LT FEMNECK ZSCORE: 0.5
DEXA LT HIP FRAX: NORMAL
DEXA LT MAJOR OSTEO FRAX: NORMAL
DEXA LT TOTFEM TSCORE: -0.7
DEXA SPINE L1-L4 T-SCORE: -1.8
DEXA SPINE L1-L4 Z-SCORE: 0.4

## 2025-03-20 PROCEDURE — 77080 DXA BONE DENSITY AXIAL: CPT

## 2025-04-04 ENCOUNTER — TELEPHONE (OUTPATIENT)
Dept: GYNECOLOGIC ONCOLOGY | Age: 70
End: 2025-04-04

## 2025-10-17 ENCOUNTER — APPOINTMENT (OUTPATIENT)
Dept: GYNECOLOGIC ONCOLOGY | Age: 70
End: 2025-10-17